# Patient Record
Sex: MALE | Race: WHITE
[De-identification: names, ages, dates, MRNs, and addresses within clinical notes are randomized per-mention and may not be internally consistent; named-entity substitution may affect disease eponyms.]

---

## 2017-04-29 NOTE — EDM.PDOC
ED HPI LOWER BACK PAIN/INJURY





- General


Chief Complaint: Back Pain or Injury


Stated Complaint: BACK PAIN


Time Seen by Provider: 04/29/17 13:40


Source: Reports: Patient


History Limitations: Reports: No limitations





- History of Present Illness


INITIAL COMMENTS - FREE TEXT/NARRATIVE: 





pt has a past history of lumbar disc diease. He had a similar episode about 12-

13 years ago, He is not having pain going down his legs.  He was very stilff 

this am and had difficulty getting out of bed. 


Timing/Duration: Reports: Day(s):, Getting worse


Location: Reports: lower


Quality: Reports: Sharp, Stabbing


Context: Reports: lifting, bending, other (Pt has been hauling alot of wood and 

doing alot of lifting and bending. )


Associated Symptoms: Reports: Denies symptoms





- Related Data


Allergies/ADRs: 


 Allergies











Allergy/AdvReac Type Severity Reaction Status Date / Time


 


No Known Allergies Allergy   Verified 04/29/17 13:21











Home Meds: 


 Home Meds





Aspirin/Calcium Carbonate/Mag [Aspirin Buffered 325 mg Tab] 4 tab PO ASDIRECTED 

04/29/17 [History]











Past Medical History


HEENT History: Reports: Hard of hearing, Other (see below)


Other HEENT History: deaf left ear


Cardiovascular History: Reports: High cholesterol


Musculoskeletal History: Reports: Back pain, chronic, Other (see below)


Other Musculoskeletal History: slipped disc


Neurological History: Reports: Vertigo


Psychiatric History: Reports: Depression





Social & Family History





- Tobacco Use


Years of Tobacco use: 60


Packs/Tins Daily: 0.5


Second Hand Smoke Exposure: No





- Caffeine Use


Caffeine Use: Reports: Coffee, Soda





- Alcohol Use


Days Per Week of Alcohol Use: 0





- Recreational Drug Use


Recreational Drug Use: No





ED ROS GENERAL





- Review of Systems


Review Of Systems: See Below


Constitutional: Reports: no symptoms


HEENT: Reports: No symptoms


Respiratory: Reports: No Symptoms


Cardiovascular: Reports: No symptoms


Endocrine: Reports: no symptoms


GI/Abdominal: Reports: No symptoms


: Reports: no symptoms


Musculoskeletal: Reports: other ( severe pain in the lower back area. )


Skin: Reports: no symptoms





ED EXAM,LOWER BACK PAIN/INJURY





- Physical Exam


Exam: See Below


Text/Narrative:: 





Pt arrived with severe lower back pain, more on the rt than the left. He has no 

pain radiating down his legs. 


Exam Limited By: No limitations


General Appearance: alert, moderate distress


Ears: normal TMs


Nose: normal inspection


Throat/Mouth: Normal inspection


Head: atraumatic


Neck: normal inspection


Respiratory/Chest: no respiratory distress


Cardiovascular: regular rate, rhythm


GI/Abdominal: soft, non tender


 (Male) Exam: Deferred


Rectal (Males) Exam: Deferred


Back Exam: other ( Pt has marked tenderness in the lumbar lower back with 

evidence of muscle spasm to palpate. )





Course





- Vital Signs


Last Recorded V/S: 


 Last Vital Signs











Temp  36.3 C   04/29/17 13:24


 


Pulse  69   04/29/17 13:24


 


Resp  16   04/29/17 13:24


 


BP  129/75   04/29/17 13:24


 


Pulse Ox  96   04/29/17 13:24














- Orders/Labs/Meds


Orders: 


 Active Orders 24 hr











 Category Date Time Status


 


 Lumbar Spine Min 4V [CR] Stat Exams  04/29/17 13:39 Taken











Meds: 


Medications














Discontinued Medications














Generic Name Dose Route Start Last Admin





  Trade Name Freq  PRN Reason Stop Dose Admin


 


Cyclobenzaprine HCl  10 mg  04/29/17 13:40  04/29/17 13:44





  Flexeril  PO  04/29/17 13:41  10 mg





  ONETIME ONE   Administration


 


Ketorolac Tromethamine  60 mg  04/29/17 13:38  04/29/17 13:44





  Toradol  IM  04/29/17 13:39  60 mg





  ONETIME ONE   Administration














- Re-Assessments/Exams


Free Text/Narrative Re-Assessment/Exam: 





04/29/17 13:47


Pt was given flexeril and torodol- im 60mg. . 


04/29/17 14:52


 xrays of the lumbar spine id not show any compression fractures or poor 

interspaces.  alignment looked good. 





Departure





- Departure


Time of Disposition: 14:53


Disposition: Home, Self-Care 01


Condition: fair


Clinical Impression: 


 Lumbar paraspinal muscle spasm





Forms:  ED Department Discharge


Care Plan Goals: 


ice back to area for the next 72 hours and then switch to moist heat.  flexeril 

10mg hs, torodol 10 mg q6h for no greater than 5 days. Take with food. rest and 

avoid pulling and lifting.  see regular Dr if persistent problems. 





- My Orders


Last 24 Hours: 


My Active Orders





04/29/17 13:39


Lumbar Spine Min 4V [CR] Stat 














- Assessment/Plan


Last 24 Hours: 


My Active Orders





04/29/17 13:39


Lumbar Spine Min 4V [CR] Stat

## 2017-05-01 NOTE — CR
Lumbar Spine Min 4V

 

HISTORY: Pain.

 

COMPARISON: None

 

FINDINGS: Minimal degenerative change with tiny anterior osteophyte formation. No acute fracture min
imal degenerative facet change.

## 2021-06-14 ENCOUNTER — HOSPITAL ENCOUNTER (EMERGENCY)
Dept: HOSPITAL 11 - JP.ED | Age: 71
Discharge: HOME | End: 2021-06-14
Payer: OTHER GOVERNMENT

## 2021-06-14 VITALS — DIASTOLIC BLOOD PRESSURE: 73 MMHG | HEART RATE: 66 BPM | SYSTOLIC BLOOD PRESSURE: 117 MMHG
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
## 2021-06-14 DIAGNOSIS — Z72.0: ICD-10-CM

## 2021-06-14 DIAGNOSIS — S22.32XA: Primary | ICD-10-CM

## 2021-06-14 NOTE — EDM.PDOC
ED HPI GENERAL MEDICAL PROBLEM





- General


Chief Complaint: General


Stated Complaint: BROKE RIBS?


Time Seen by Provider: 06/14/21 20:50


Source of Information: Reports: Patient


History Limitations: Reports: No Limitations





- History of Present Illness


INITIAL COMMENTS - FREE TEXT/NARRATIVE: 





70-year-old male who slipped on some wet steps 3 days ago landing hard on his 

left posterior chest wall, in because of persistent pain and bruising.  It hurts

to breathe but he is not short of breath, no nausea or vomiting, no abdominal 

pain.  He thought he was doing quite a bit better yesterday but today it was 

hurting again and he had difficulty sleeping last night so he came in to be 

checked.  He has been taking aspirin every 4 hours which helps.


Onset: Sudden


Duration: Day(s): (3 days ago)


Location: Reports: Chest (Left lateral chest and left flank)


Quality: Reports: Sharp, Stabbing


Worsens with: Reports: Other (Hurts to move and breathe)


Associated Symptoms: Reports: No Other Symptoms





- Related Data


                                    Allergies











Allergy/AdvReac Type Severity Reaction Status Date / Time


 


No Known Allergies Allergy   Verified 06/14/21 20:29











Home Meds: 


                                    Home Meds





Aspirin/Calcium Carbonate/Mag [Aspirin Buffered 325 mg Tab] 4 tab PO ASDIRECTED 

04/29/17 [History]











Past Medical History


HEENT History: Reports: Hard of Hearing, Other (See Below)


Other HEENT History: deaf left ear


Cardiovascular History: Reports: High Cholesterol


Musculoskeletal History: Reports: Back Pain, Chronic, Other (See Below)


Other Musculoskeletal History: slipped disc


Neurological History: Reports: Vertigo


Psychiatric History: Reports: Depression





- Past Surgical History


Other Oncologic Surgeries/Procedures: biopsy of lump in throat inconclsive





Social & Family History





- Tobacco Use


Tobacco Use Status *Q: Current Some Day Tobacco User


Years of Tobacco use: 50


Packs/Tins Daily: 0.2





- Caffeine Use


Caffeine Use: Reports: None





- Recreational Drug Use


Recreational Drug Use: No





ED ROS GENERAL





- Review of Systems


Review Of Systems: See Below


Constitutional: Denies: Fever, Chills, Malaise


HEENT: Reports: No Symptoms


Respiratory: Reports: Pleuritic Chest Pain.  Denies: Shortness of Breath


Cardiovascular: Reports: Chest Pain


GI/Abdominal: Denies: Abdominal Pain, Nausea, Vomiting


Skin: Reports: Bruising (Has developed bruising over the left lateral flank 

area)


Neurological: Reports: No Symptoms


Psychiatric: Reports: No Symptoms





ED EXAM, GENERAL





- Physical Exam


Exam: See Below


Exam Limited By: No Limitations


General Appearance: Alert, No Apparent Distress


Eye Exam: Bilateral Eye: Normal Inspection


Head: Atraumatic


Neck: Supple, Non-Tender


Respiratory/Chest: Lungs Clear, Other (Posterior left chest has significant 

ecchymosis, very tender to palpation but no deformity or crepitus is felt)


Cardiovascular: Regular Rate, Rhythm


GI/Abdominal: Soft


Extremities: Normal Inspection


Neurological: Alert, Oriented


Psychiatric: Normal Affect, Normal Mood


Skin Exam: Ecchymosis (Left flank area)





Course





- Vital Signs


Last Recorded V/S: 


                                Last Vital Signs











Temp  97.3 F   06/14/21 20:36


 


Pulse  66   06/14/21 20:36


 


Resp  18   06/14/21 20:36


 


BP  117/73   06/14/21 20:36


 


Pulse Ox  96   06/14/21 20:36














- Re-Assessments/Exams


Free Text/Narrative Re-Assessment/Exam: 





06/14/21 21:17


CT the chest without contrast was obtained and actually looks really good.  

Awaiting for the formal reading, patient is anxious to get going so he will be 

discharged with 10 hydrocodone for extra pain control, will continue with anti-

inflammatories, increase activity as tolerated and return if worsening such as 

difficulty breathing or fever.


06/14/21 22:18





IMPRESSION:





Mild pleural hematoma in the left posterior lateral lower chest associated with 

an acute, nondisplaced fracture of the posterior-lateral left 9th rib. 





Small pleural hematoma located along the posterior aspect of the left 10th rib, 

but without definite fracture. 





No sign of pneumothorax or pulmonary contusion. 





Above results were discussed with the patient.





Departure





- Departure


Time of Disposition: 21:27


Disposition: Home, Self-Care 01


Clinical Impression: 


Rib fracture


Qualifiers:


 Encounter type: initial encounter Rib fracture type: single rib Fracture type: 

closed Laterality: left Qualified Code(s): S22.32XA - Fracture of one rib, left 

side, initial encounter for closed fracture





Contusion of left chest wall


Qualifiers:


 Encounter type: initial encounter Qualified Code(s): S20.212A - Contusion of 

left front wall of thorax, initial encounter








- Discharge Information


Instructions:  Contusion, Easy-to-Read


Referrals: 


PCP,None [Primary Care Provider] - 


Forms:  ED Department Discharge


Care Plan Goals: 


A regular anti-inflammatory such as ibuprofen or naproxen will be helpful, and 

add stronger pain medication if needed as directed.  Increase activity as 

tolerated, concentrate on getting full breaths on a regular basis, and return 

anytime if worsening such as shortness of breath, coughing up blood, 

uncontrolled pain or other concerns.





Sepsis Event Note (ED)





- Evaluation


Sepsis Screening Result: No Definite Risk





- Focused Exam


Vital Signs: 


                                   Vital Signs











  Temp Pulse Resp BP Pulse Ox


 


 06/14/21 20:36  97.3 F  66  18  117/73  96


 


 06/14/21 20:28  97.3 F  66  18  117/73  96

## 2021-06-14 NOTE — CRLCT
For Patients:  As a result of the 21st Century Cures Act, medical imaging 

exams and procedure reports are released immediately into your electronic 

medical record.  You may view this report before your referring provider.  

If you have questions, please contact your health care provider.



INDICATION:



Status post fall with left-sided pain. 



COMPARISON:



None available 



TECHNIQUE:



CT examination of the chest was performed without contrast enhancement. 3 

mm thick axial sections were obtained from above the apices of the lungs to 

the lung bases. 



Please note that all CT scans at this facility use dose modulation, 

iterative reconstruction, and/or weight-based dosing when appropriate to 

reduce radiation dose to as low as reasonably achievable. 



FINDINGS:



There is mild subcutaneous soft tissue stranding in the posterior left 

lower chest wall consistent with a mild contusion. There is no sign of any 

hematoma, soft tissue gas, or radiopaque foreign body. 



There is mild pleural thickening located along the posterior-lateral left 

chest wall adjacent to the posterior-lateral left 9th rib and the posterior 

left 10th rib. There appears to be an acute, nondisplaced fracture of the 

posterior-lateral left 9th rib, best seen on axial image 77 series 3. I 

cannot definitely identify a fracture of the 10th rib. 



There is no sign of pneumothorax, pulmonary contusion, or pleural effusion 

on either side. 



There is mild left apical pleural thickening with calcification suggesting 

previous asbestos exposure. There is minimal right apical pleural 

thickening without calcification. 



There is a noncalcified subpleural nodule measuring 4 millimeters in length 

along the lateral aspect of the right major fissure on axial image 70 

series 3. 



There is a noncalcified 4 millimeter pleural nodule in the posterior-medial 

left lung base on axial image 94 series 3.



There is a noncalcified 2 millimeter subpleural nodule in the 

anterior-lateral left upper lobe on axial image 26 series 3. A similar 2 

millimeter calcified pleural nodule is seen anterior-laterally in the left 

upper lobe on axial image 30 series 3.



These nodules can be followed using Fleischer society criteria. 



The lungs are clear with no sign of significant infiltrate or mass.



There is no sign of mediastinal or hilar mass or adenopathy. Sensitivity is 

limited by lack of contrast enhancement.



There is minimal LAD coronary calcification. The heart is otherwise normal 

in appearance for the patient`s age.



There is age appropriate appearance of the thoracic aorta and ascending 

great vessels. 



There is no sign of supraclavicular or axillary mass or adenopathy. 



The visualized superior liver, spleen, pancreas, kidneys, and adrenals are 

normal in appearance. 



There is no sign of any additional rib fracture. There is no sign of 

fracture of the visualized shoulder girdle sternum, manubrium, or thoracic 

spine.



IMPRESSION:



Mild pleural hematoma in the left posterior lateral lower chest associated 

with an acute, nondisplaced fracture of the posterior-lateral left 9th rib. 

Small pleural hematoma located along the posterior aspect of the left 10th 

rib, but without definite fracture. 



No sign of pneumothorax or pulmonary contusion. 



Several small pleural and subpleural nodules as described above in both 

lungs can be followed using Fleischner society criteria. 



Mild left apical pleural thickening and calcifications suggesting previous 

asbestos exposure.



Please note that all CT scans at this facility use dose modulation, 

iterative reconstruction, and/or weight-based dosing when appropriate to 

reduce radiation dose to as low as reasonably achievable.



Dictated by Skyler Hernandez MD @ 6/14/2021 10:03:37 PM



Signed by Dr. Skyler Hernandez @ Jun 14 2021 10:03PM

## 2021-06-23 ENCOUNTER — HOSPITAL ENCOUNTER (EMERGENCY)
Dept: HOSPITAL 11 - JP.ED | Age: 71
Discharge: HOME | End: 2021-06-23
Payer: OTHER GOVERNMENT

## 2021-06-23 VITALS — SYSTOLIC BLOOD PRESSURE: 136 MMHG | DIASTOLIC BLOOD PRESSURE: 68 MMHG | HEART RATE: 76 BPM

## 2021-06-23 DIAGNOSIS — S22.32XD: Primary | ICD-10-CM

## 2021-06-23 DIAGNOSIS — Z76.0: ICD-10-CM

## 2021-06-23 DIAGNOSIS — E78.00: ICD-10-CM
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
## 2021-06-23 DIAGNOSIS — Z72.0: ICD-10-CM

## 2021-06-23 DIAGNOSIS — Z79.82: ICD-10-CM

## 2021-06-23 NOTE — EDM.PDOC
ED HPI GENERAL MEDICAL PROBLEM





- General


Chief Complaint: General


Stated Complaint: BROKEN RIB PAIN


Time Seen by Provider: 06/23/21 15:35


Source of Information: Reports: Patient, Old Records, RN


History Limitations: Reports: No Limitations





- History of Present Illness


INITIAL COMMENTS - FREE TEXT/NARRATIVE: 





71 [facility] patient was here a couple weeks ago and broke a rib. Dr. Aguilar 

invited him to return for any reason and he is out of his meds so he came back 

for a refill. The med, [place], helped him and he had no SE's. 


Onset: Sudden


Onset Date: 06/09/21


Duration: Week(s): (2), Constant


Location: Reports: Chest


Quality: Reports: Sharp


Severity: Moderate


Improves with: Reports: Rest


Worsens with: Reports: Movement


Context: Reports: Trauma


Associated Symptoms: Reports: No Other Symptoms


Treatments PTA: Reports: Other (see below) (none)


  ** Left Thoracic


Pain Score (Numeric/FACES): 3





- Related Data


                                    Allergies











Allergy/AdvReac Type Severity Reaction Status Date / Time


 


No Known Allergies Allergy   Verified 06/23/21 15:28











Home Meds: 


                                    Home Meds





Aspirin/Calcium Carbonate/Mag [Aspirin Buffered 325 mg Tab] 4 tab PO ASDIRECTED 

04/29/17 [History]


Acetaminophen [Tylenol Extra Strength] 500 mg PO Q6H PRN 06/23/21 [History]











Past Medical History


HEENT History: Reports: Hard of Hearing, Other (See Below)


Other HEENT History: deaf left ear


Cardiovascular History: Reports: High Cholesterol


Musculoskeletal History: Reports: Back Pain, Chronic, Other (See Below)


Other Musculoskeletal History: slipped disc


Neurological History: Reports: Vertigo


Psychiatric History: Reports: Depression





- Past Surgical History


Other Oncologic Surgeries/Procedures: biopsy of lump in throat inconclsive





Social & Family History





- Tobacco Use


Tobacco Use Status *Q: Current Every Day Tobacco User


Years of Tobacco use: 53


Packs/Tins Daily: 0.5





- Caffeine Use


Caffeine Use: Reports: Coffee





- Recreational Drug Use


Recreational Drug Use: No





ED ROS GENERAL





- Review of Systems


Review Of Systems: See Below


Constitutional: Reports: No Symptoms


HEENT: Reports: No Symptoms


Respiratory: Reports: Pleuritic Chest Pain


Cardiovascular: Reports: No Symptoms


Endocrine: Reports: No Symptoms


GI/Abdominal: Reports: No Symptoms


Skin: Reports: Bruising (L flank)





ED EXAM, GENERAL





- Physical Exam


Exam: See Below


Exam Limited By: No Limitations


General Appearance: Alert, WD/WN, No Apparent Distress


Eye Exam: Bilateral Eye: Normal Inspection


Ears: Normal External Exam, Normal Canal, Hearing Grossly Normal


Ear Exam: Bilateral Ear: Auricle Normal, Canal Normal


Nose: Normal Inspection


Throat/Mouth: Normal Lips, Normal Voice, No Airway Compromise


Head: Atraumatic, Normocephalic


Respiratory/Chest: No Respiratory Distress, Lungs Clear, Normal Breath Sounds


Cardiovascular: Regular Rate, Rhythm, No Edema


Neurological: Alert, Oriented, CN II-XII Intact, Normal Cognition, No 

Motor/Sensory Deficits


Psychiatric: Normal Affect, Normal Mood


Skin Exam: Warm, Dry, Intact, No Rash, Ecchymosis (10 x 9 cm area of bruising L 

flank area)





Course





- Vital Signs


Last Recorded V/S: 


                                Last Vital Signs











Temp  36.0 C L  06/23/21 15:32


 


Pulse  76   06/23/21 15:32


 


Resp  16   06/23/21 15:32


 


BP  136/68   06/23/21 15:32


 


Pulse Ox  96   06/23/21 15:32














Departure





- Departure


Time of Disposition: 15:48


Disposition: Home, Self-Care 01


Condition: Good


Clinical Impression: 


Rib fracture


Qualifiers:


 Encounter type: initial encounter Rib fracture type: single rib Fracture type: 

closed Laterality: left Qualified Code(s): S22.32XA - Fracture of one rib, left 

side, initial encounter for closed fracture








- Discharge Information


*PRESCRIPTION DRUG MONITORING PROGRAM REVIEWED*: No


*COPY OF PRESCRIPTION DRUG MONITORING REPORT IN PATIENT LOC: No


Referrals: 


PCP,None [Primary Care Provider] - 


Forms:  ED Department Discharge


Additional Instructions: 


Use ibuprofen and/or acetaminophen for pain relief. Substitute hydrocodone for 

the acetaminophen if more pain relief is needed. Recheck as needed. 





Sepsis Event Note (ED)





- Evaluation


Sepsis Screening Result: No Definite Risk





- Focused Exam


Vital Signs: 


                                   Vital Signs











  Temp Pulse Resp BP Pulse Ox


 


 06/23/21 15:32  36.0 C L  76  16  136/68  96


 


 06/23/21 15:26  36.0 C L  76  16  136/68  96